# Patient Record
Sex: FEMALE | Race: BLACK OR AFRICAN AMERICAN | NOT HISPANIC OR LATINO | Employment: STUDENT | URBAN - METROPOLITAN AREA
[De-identification: names, ages, dates, MRNs, and addresses within clinical notes are randomized per-mention and may not be internally consistent; named-entity substitution may affect disease eponyms.]

---
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
## 2021-01-06 ENCOUNTER — OFFICE VISIT (OUTPATIENT)
Dept: OBGYN | Facility: CLINIC | Age: 21
End: 2021-01-06
Attending: PEDIATRICS
Payer: COMMERCIAL

## 2021-01-06 VITALS
OXYGEN SATURATION: 99 % | SYSTOLIC BLOOD PRESSURE: 114 MMHG | WEIGHT: 193 LBS | HEIGHT: 65 IN | BODY MASS INDEX: 32.15 KG/M2 | HEART RATE: 88 BPM | DIASTOLIC BLOOD PRESSURE: 76 MMHG

## 2021-01-06 DIAGNOSIS — N94.9 GENITAL LESION, FEMALE: Primary | ICD-10-CM

## 2021-01-06 DIAGNOSIS — N91.2 AMENORRHEA: ICD-10-CM

## 2021-01-06 PROCEDURE — 99203 OFFICE O/P NEW LOW 30 MIN: CPT | Performed by: ADVANCED PRACTICE MIDWIFE

## 2021-01-06 RX ORDER — TRIAMCINOLONE ACETONIDE 1 MG/G
OINTMENT TOPICAL 2 TIMES DAILY
Qty: 15 G | Refills: 0 | Status: SHIPPED | OUTPATIENT
Start: 2021-01-06 | End: 2021-07-21

## 2021-01-06 ASSESSMENT — PAIN SCALES - GENERAL: PAINLEVEL: NO PAIN (0)

## 2021-01-06 ASSESSMENT — MIFFLIN-ST. JEOR: SCORE: 1651.32

## 2021-01-06 NOTE — PROGRESS NOTES
Lauren Tian is a 20 year old who presents to the clinic for evaluation of amenorrhea and a genital lesion.   Menarche age 14 with a 3 day long 28 day cycle until she was 18 when she started depo for contraception.  Used on and off until last dose in April of 2020.  Had normal menses through LMP of 10/17 and none since.   Last sexual activity 10/27.  Recent UPT is negative.   Recent Gc/CT negative as well.   Admits that she has stress in her life but is not specific about what it entails other than COVID. No change in weight, skin hair etc.       Also has a lesion on her clitoris for about a year.  Flares about once a month and itches and then becomes sore and irritated.   Has been seen for this multiple times with no treatment or definitive diagnosis.  Not sure what makes it better or worse and has not applied anything to it.  Thinks that her clitoris always looks the same and that doesn't change with the symptoms      Histories reviewed and updated  Past Medical History:   Diagnosis Date     Seasonal allergic rhinitis      Past Surgical History:   Procedure Laterality Date     NO HISTORY OF SURGERY       Social History     Socioeconomic History     Marital status: Single     Spouse name: Not on file     Number of children: Not on file     Years of education: Not on file     Highest education level: Not on file   Occupational History     Not on file   Social Needs     Financial resource strain: Not on file     Food insecurity     Worry: Not on file     Inability: Not on file     Transportation needs     Medical: Not on file     Non-medical: Not on file   Tobacco Use     Smoking status: Never Smoker     Smokeless tobacco: Never Used   Substance and Sexual Activity     Alcohol use: No     Alcohol/week: 0.0 standard drinks     Drug use: No     Sexual activity: Not Currently     Partners: Male     Birth control/protection: Injection   Lifestyle     Physical activity     Days per week: Not on file     Minutes per  "session: Not on file     Stress: Not on file   Relationships     Social connections     Talks on phone: Not on file     Gets together: Not on file     Attends [employer] service: Not on file     Active member of club or organization: Not on file     Attends meetings of clubs or organizations: Not on file     Relationship status: Not on file     Intimate partner violence     Fear of current or ex partner: Not on file     Emotionally abused: Not on file     Physically abused: Not on file     Forced sexual activity: Not on file   Other Topics Concern     Not on file   Social History Narrative     Not on file     Family History   Problem Relation Age of Onset     No Known Problems Mother      Hypertension Maternal Grandmother      Hypertension Maternal Grandfather      Cerebrovascular Disease Father 43     No Known Problems Sister      Cancer No family hx of      Diabetes No family hx of      Thyroid Disease No family hx of      Glaucoma No family hx of      Macular Degeneration No family hx of           ROS: 10 point ROS neg other than the symptoms noted above in the HPI.    EXAM:  /76 (BP Location: Right arm, Patient Position: Sitting, Cuff Size: Adult Regular)   Pulse 88   Ht 1.651 m (5' 5\")   Wt 87.5 kg (193 lb)   LMP 10/22/2020 (Exact Date)   SpO2 99%   BMI 32.12 kg/m    GENERAL:  Pleasant female, no acute distress  EYES: sclera clear  RESP: breathing unlabored  CV: extremities without edema  M/S: no gross deformities  Neuro:  normal strength and sensation  : PELVIC EXAM:  Clitoris:  Appears slightly edematous.  There is basically a white line running down the center of the clitoris.  There are no ulcerations noted, no typical appearance of HPV or HSV.  Not typical of lichen planus or sclerosis either.     Vulva: No external lesions, normal hair distribution, no adenopathy, BUS WNL    Rectal exam: deferred  Psych:  alert, with normal speech and behavior        ASSESSMENT/PLAN:    (N94.9) Genital " lesion, female  (primary encounter diagnosis)  Comment:   Plan: triamcinolone (KENALOG) 0.1 % external ointment bid x 7 days  Recommend follow up with Dr Gunn if no relief.             (N91.2) Amenorrhea  Comment:   Plan:   Discussed amenorrhea, causes and concerns.   Would recommend follow up in 6 months from LMP in no menses in the mean time.          Visit length 30 minutes was spent face to face with the patient today discussing her history, diagnosis, and follow-up plan as noted above.  Over 50% of the visit was spent in counseling and coordination of care.    Time noted does not include time required to complete procedures.

## 2021-01-08 ENCOUNTER — OFFICE VISIT (OUTPATIENT)
Dept: OBGYN | Facility: CLINIC | Age: 21
End: 2021-01-08
Payer: COMMERCIAL

## 2021-01-08 VITALS
HEART RATE: 70 BPM | OXYGEN SATURATION: 100 % | WEIGHT: 192.1 LBS | DIASTOLIC BLOOD PRESSURE: 64 MMHG | BODY MASS INDEX: 31.97 KG/M2 | SYSTOLIC BLOOD PRESSURE: 115 MMHG

## 2021-01-08 DIAGNOSIS — N94.9 GENITAL LESION, FEMALE: ICD-10-CM

## 2021-01-08 DIAGNOSIS — Z30.011 ENCOUNTER FOR INITIAL PRESCRIPTION OF CONTRACEPTIVE PILLS: Primary | ICD-10-CM

## 2021-01-08 PROCEDURE — 99214 OFFICE O/P EST MOD 30 MIN: CPT | Performed by: OBSTETRICS & GYNECOLOGY

## 2021-01-08 RX ORDER — NORETHINDRONE ACETATE AND ETHINYL ESTRADIOL 1MG-20(21)
1 KIT ORAL DAILY
Qty: 84 TABLET | Refills: 3 | Status: SHIPPED | OUTPATIENT
Start: 2021-01-08

## 2021-01-08 NOTE — PROGRESS NOTES
OB/GYN      NAME:  Lauren Tian  PCP:  Travon Bernadine Radha  MRN:  [id number]    Impression / Plan     20 year old  with:      ICD-10-CM    1. Encounter for initial prescription of contraceptive pills  Z30.011 norethindrone-ethinyl estradiol (JUNEL FE 1/20) 1-20 MG-MCG tablet   2. Genital lesion, female  N94.9        Clitoral lesion concerning for lichen simplex chronicus.  Differential also includes lichen planus, HPV.  Plan triamcinolone ointment as prescribed by Leidy.  She will send us a 1bib message next Friday if her symptoms have not started to improve.      Amenorrhea - agree with Leidy - follow up in March if no menses, sooner as needed.     Patient would like to start birth control - discussed options and she would like to start MELANIE.  R/b/a discussed. She will be reasonably sure she is not pregnant before starting the pill.  Instructions and precautions given.  Menses may resume with the start of the MELANIE.     Chief Complaint     Chief Complaint   Patient presents with     Vaginal Problem       HPI     Lauren Tian is a  20 year old female who is seen for follow up.      Lesion on clitoris first noticed 6-7 months ago.  Itching. Comes and goes.  Pain only if she itches it too much.  Not sure if anything in particular makes it worse or better. Feels swollen.     Has not yet started the ointment that was prescribed by Leidy a couple days ago.     No abnormal discharge.     No exposure to STIs.     Not sexually active.    Was on Depo, last dose 3 months prior to July.  No period with the depo.  Normal cycles through October, nothing since.  Recent UPT negative.  Leidy recommended follow up in March if no menses.      At the end of the visit the patient asked about birth control.      Patient's last menstrual period was 10/26/2020.           Problem List     Patient Active Problem List    Diagnosis Date Noted     Amenorrhea 2021     Priority: Medium     Seasonal allergic  rhinitis 09/08/2014     Priority: Medium       Medications     Current Outpatient Medications   Medication     triamcinolone (KENALOG) 0.1 % external ointment     No current facility-administered medications for this visit.         Allergies     Allergies   Allergen Reactions     Pollen Extract        ROS     A  organ review of systems was asked and the pertinent positives and negatives are listed in the HPI.     Physical Exam   Vitals: /64 (BP Location: Right arm, Cuff Size: Adult Regular)   Pulse 70   Wt 87.1 kg (192 lb 1.6 oz)   LMP 10/26/2020   SpO2 100%   Breastfeeding No   BMI 31.97 kg/m      General: Comfortable, no obvious distress  :  Clitoral lesion at 6 oclock, less than 0.5 cm, hypopigmented. Clitoral area and labia are thickened bilaterally. No abnormal discharge.  No other lesions.           36 minutes spent on the date of the encounter doing chart review, patient visit and documentation     Joie Gunn MD

## 2022-02-19 ENCOUNTER — OFFICE VISIT (OUTPATIENT)
Dept: URGENT CARE | Facility: URGENT CARE | Age: 22
End: 2022-02-19
Payer: COMMERCIAL

## 2022-02-19 VITALS
TEMPERATURE: 97.3 F | OXYGEN SATURATION: 97 % | SYSTOLIC BLOOD PRESSURE: 114 MMHG | BODY MASS INDEX: 33.45 KG/M2 | WEIGHT: 201 LBS | HEART RATE: 76 BPM | DIASTOLIC BLOOD PRESSURE: 75 MMHG | RESPIRATION RATE: 22 BRPM

## 2022-02-19 DIAGNOSIS — N76.0 VAGINITIS AND VULVOVAGINITIS: ICD-10-CM

## 2022-02-19 DIAGNOSIS — R30.0 DYSURIA: ICD-10-CM

## 2022-02-19 DIAGNOSIS — B96.89 BV (BACTERIAL VAGINOSIS): Primary | ICD-10-CM

## 2022-02-19 DIAGNOSIS — N76.0 BV (BACTERIAL VAGINOSIS): Primary | ICD-10-CM

## 2022-02-19 LAB
ALBUMIN UR-MCNC: NEGATIVE MG/DL
APPEARANCE UR: CLEAR
BILIRUB UR QL STRIP: NEGATIVE
CLUE CELLS: PRESENT
COLOR UR AUTO: YELLOW
GLUCOSE UR STRIP-MCNC: NEGATIVE MG/DL
HGB UR QL STRIP: NEGATIVE
KETONES UR STRIP-MCNC: NEGATIVE MG/DL
LEUKOCYTE ESTERASE UR QL STRIP: NEGATIVE
NITRATE UR QL: NEGATIVE
PH UR STRIP: 6 [PH] (ref 5–7)
SP GR UR STRIP: >=1.03 (ref 1–1.03)
TRICHOMONAS, WET PREP: ABNORMAL
UROBILINOGEN UR STRIP-ACNC: 1 E.U./DL
WBC'S/HIGH POWER FIELD, WET PREP: ABNORMAL
YEAST, WET PREP: ABNORMAL

## 2022-02-19 PROCEDURE — 87210 SMEAR WET MOUNT SALINE/INK: CPT | Performed by: PHYSICIAN ASSISTANT

## 2022-02-19 PROCEDURE — 99214 OFFICE O/P EST MOD 30 MIN: CPT | Performed by: PHYSICIAN ASSISTANT

## 2022-02-19 PROCEDURE — 81003 URINALYSIS AUTO W/O SCOPE: CPT | Performed by: PHYSICIAN ASSISTANT

## 2022-02-19 RX ORDER — FLUCONAZOLE 150 MG/1
150 TABLET ORAL ONCE
Qty: 2 TABLET | Refills: 0 | Status: SHIPPED | OUTPATIENT
Start: 2022-02-19 | End: 2022-02-19

## 2022-02-19 RX ORDER — METRONIDAZOLE 500 MG/1
500 TABLET ORAL 2 TIMES DAILY
Qty: 14 TABLET | Refills: 0 | Status: SHIPPED | OUTPATIENT
Start: 2022-02-19 | End: 2022-02-26

## 2022-02-19 RX ORDER — CETIRIZINE HYDROCHLORIDE 10 MG/1
1 TABLET ORAL DAILY
COMMUNITY
Start: 2021-04-05

## 2022-02-19 ASSESSMENT — ENCOUNTER SYMPTOMS
CHILLS: 0
CARDIOVASCULAR NEGATIVE: 1
VOMITING: 0
DYSURIA: 1
FATIGUE: 0
NAUSEA: 0
WHEEZING: 0
CONSTIPATION: 0
HEARTBURN: 0
FLANK PAIN: 0
FEVER: 0
SHORTNESS OF BREATH: 0
RESPIRATORY NEGATIVE: 1
HEMATURIA: 0
ABDOMINAL PAIN: 0
HEMATOCHEZIA: 0
CHEST TIGHTNESS: 0
GASTROINTESTINAL NEGATIVE: 1
PALPITATIONS: 0
COUGH: 0
DIARRHEA: 0
FREQUENCY: 1

## 2022-02-19 NOTE — PROGRESS NOTES
Subjective   Lauren is a 21 year old who presents for the following health issues   HPI   Vaginal Symptoms  Onset/Duration: 2months, on and off  Description:  Vaginal Discharge: white   Itching (Pruritis): YES  Burning sensation:  YES  Odor: no  Accompanying Signs & Symptoms:  Urinary symptoms: YES- dysuria and frequency but no blood present  Abdominal pain: no  Fever: no  History:   Sexually active: no  New Partner: no  Possibility of Pregnancy:  no  Recent antibiotic use: no  Previous vaginitis issues: no  Precipitating or alleviating factors: None  Therapies tried and outcome: OTC yeast infection cream with minimal relief      Patient Active Problem List   Diagnosis     Seasonal allergic rhinitis     Amenorrhea         Allergies   Allergen Reactions     Pollen Extract        Review of Systems   Constitutional: Negative for chills, fatigue and fever.   Respiratory: Negative.  Negative for cough, chest tightness, shortness of breath and wheezing.    Cardiovascular: Negative.  Negative for chest pain, palpitations and peripheral edema.   Gastrointestinal: Negative.  Negative for abdominal pain, constipation, diarrhea, heartburn, hematochezia, nausea and vomiting.   Genitourinary: Positive for dysuria, frequency and vaginal discharge. Negative for flank pain, hematuria, pelvic pain, urgency and vaginal bleeding.   All other systems reviewed and are negative.           Objective    /75 (BP Location: Left arm, Patient Position: Sitting, Cuff Size: Adult Large)   Pulse 76   Temp 97.3  F (36.3  C) (Tympanic)   Resp 22   Wt 91.2 kg (201 lb)   SpO2 97%   BMI 33.45 kg/m    Body mass index is 33.45 kg/m .  Physical Exam  Vitals and nursing note reviewed.   Constitutional:       General: She is not in acute distress.     Appearance: Normal appearance. She is well-developed and normal weight. She is not ill-appearing.   Cardiovascular:      Rate and Rhythm: Normal rate and regular rhythm.      Pulses: Normal  pulses.      Heart sounds: Normal heart sounds, S1 normal and S2 normal. No murmur heard.    No friction rub. No gallop.   Pulmonary:      Effort: Pulmonary effort is normal. No accessory muscle usage or respiratory distress.      Breath sounds: Normal breath sounds and air entry. No decreased breath sounds, wheezing, rhonchi or rales.   Abdominal:      General: Abdomen is flat. Bowel sounds are normal.      Palpations: Abdomen is soft. There is no hepatomegaly, splenomegaly or mass.      Tenderness: There is no abdominal tenderness. There is no right CVA tenderness, left CVA tenderness, guarding or rebound. Negative signs include Norwood's sign, Rovsing's sign, McBurney's sign, psoas sign and obturator sign.      Hernia: No hernia is present.   Genitourinary:     General: Normal vulva.      Exam position: Lithotomy position.      Pubic Area: No rash or pubic lice.       Labia:         Right: No rash, tenderness or lesion.         Left: No rash, tenderness or lesion.       Urethra: No prolapse or urethral pain.      Vagina: No signs of injury and foreign body. Vaginal discharge present. No erythema, tenderness, bleeding, lesions or prolapsed vaginal walls.      Cervix: No cervical motion tenderness, discharge, friability, lesion, erythema, cervical bleeding or eversion.      Uterus: Normal.       Adnexa: Right adnexa normal and left adnexa normal.        Right: No mass or tenderness.          Left: No mass or tenderness.     Skin:     General: Skin is warm and dry.   Neurological:      Mental Status: She is alert and oriented to person, place, and time.   Psychiatric:         Mood and Affect: Mood normal.         Behavior: Behavior normal.         Thought Content: Thought content normal.         Judgment: Judgment normal.       Results for orders placed or performed in visit on 02/19/22 (from the past 24 hour(s))   Wet prep - lab collect    Specimen: Vagina; Swab   Result Value Ref Range    Trichomonas Absent Absent     Yeast Absent Absent    Clue Cells Present (A) Absent    WBCs/high power field 1+ (A) None   UA Macro with Reflex to Micro and Culture - lab collect    Specimen: Urine, Midstream   Result Value Ref Range    Color Urine Yellow Colorless, Straw, Light Yellow, Yellow    Appearance Urine Clear Clear    Glucose Urine Negative Negative mg/dL    Bilirubin Urine Negative Negative    Ketones Urine Negative Negative mg/dL    Specific Gravity Urine >=1.030 1.003 - 1.035    Blood Urine Negative Negative    pH Urine 6.0 5.0 - 7.0    Protein Albumin Urine Negative Negative mg/dL    Urobilinogen Urine 1.0 0.2, 1.0 E.U./dL    Nitrite Urine Negative Negative    Leukocyte Esterase Urine Negative Negative    Narrative    Microscopic not indicated          Assessment/Plan:  BV (bacterial vaginosis):  Wet prep showed clue cells present.  She declined STD testing. Will treat with metronidazole X1week.  No ETOH while on medications due to disulfuram reaction.  Avoid foreign body insertion, scented personal care products and frequent baths.  Recheck in clinic if symptoms worsen or if symptoms do not improve.  Will send to GYN if no improvement.  -     Wet prep - lab collect  -     metroNIDAZOLE (FLAGYL) 500 MG tablet; Take 1 tablet (500 mg) by mouth 2 times daily for 7 days    Vaginitis and vulvovaginitis:  Will also give diflucan for possible yeast as well.  -     fluconazole (DIFLUCAN) 150 MG tablet; Take 1 tablet (150 mg) by mouth once for 1 dose May repeat after 3days  -     Ob/Gyn Referral    Dysuria:  UA was normal.  Most likely vaginitis related.  -     Wet prep - lab collect; Future  -     UA Macro with Reflex to Micro and Culture - lab collect; Future  -     UA Macro with Reflex to Micro and Culture - lab collect        Libia Zheng PA-C

## 2022-07-03 ENCOUNTER — HEALTH MAINTENANCE LETTER (OUTPATIENT)
Age: 22
End: 2022-07-03

## 2023-01-08 ENCOUNTER — HEALTH MAINTENANCE LETTER (OUTPATIENT)
Age: 23
End: 2023-01-08

## 2023-07-16 ENCOUNTER — HEALTH MAINTENANCE LETTER (OUTPATIENT)
Age: 23
End: 2023-07-16

## 2024-07-01 LAB — CHLAMYDIA - HIM PATIENT REPORTED: NORMAL

## 2024-08-01 LAB — RETINOPATHY: NORMAL

## 2024-09-08 ENCOUNTER — HEALTH MAINTENANCE LETTER (OUTPATIENT)
Age: 24
End: 2024-09-08

## 2024-10-01 ENCOUNTER — TRANSFERRED RECORDS (OUTPATIENT)
Dept: MULTI SPECIALTY CLINIC | Facility: CLINIC | Age: 24
End: 2024-10-01

## 2024-10-01 LAB — PAP SMEAR - HIM PATIENT REPORTED: NORMAL

## 2025-02-24 ENCOUNTER — OFFICE VISIT (OUTPATIENT)
Dept: FAMILY MEDICINE | Facility: CLINIC | Age: 25
End: 2025-02-24
Payer: COMMERCIAL

## 2025-02-24 VITALS
TEMPERATURE: 97.8 F | DIASTOLIC BLOOD PRESSURE: 78 MMHG | RESPIRATION RATE: 20 BRPM | HEIGHT: 67 IN | HEART RATE: 80 BPM | WEIGHT: 232 LBS | OXYGEN SATURATION: 97 % | BODY MASS INDEX: 36.41 KG/M2 | SYSTOLIC BLOOD PRESSURE: 120 MMHG

## 2025-02-24 DIAGNOSIS — J06.9 VIRAL URI WITH COUGH: Primary | ICD-10-CM

## 2025-02-24 PROCEDURE — 99203 OFFICE O/P NEW LOW 30 MIN: CPT | Performed by: PHYSICIAN ASSISTANT

## 2025-02-24 RX ORDER — BENZONATATE 200 MG/1
200 CAPSULE ORAL 3 TIMES DAILY PRN
Qty: 40 CAPSULE | Refills: 0 | Status: SHIPPED | OUTPATIENT
Start: 2025-02-24

## 2025-02-24 NOTE — PROGRESS NOTES
"  Assessment & Plan     Viral URI with cough  With current high incidence of Influenza A likely influenza. Offered testing, patient declined at will not change treatment plan. Did discuss could also be COVID, declines testing. Discussed symptom care and will try tessalon as needed.   Follow up if fever or worsening shortness of breath or symptoms.   - benzonatate (TESSALON) 200 MG capsule; Take 1 capsule (200 mg) by mouth 3 times daily as needed for cough.          BMI  Estimated body mass index is 36.34 kg/m  as calculated from the following:    Height as of this encounter: 1.702 m (5' 7\").    Weight as of this encounter: 105.2 kg (232 lb).             Subjective   Lauren is a 24 year old, presenting for the following health issues:  Cough and Pharyngitis        2/24/2025     9:32 AM   Additional Questions   Roomed by Isidra VERMA       Via the [employer], the patient has reported the following services have been completed -Chlamydia: n/a 2024-07-01-Eye Exam: [facility] 2024-08-01-Cervical Cancer Screening: n/a 2024-10-01, this information has been sent to the abstraction team.  History of Present Illness       Reason for visit:  Cold  Symptom onset:  3-7 days ago  Symptoms include:  Coughing, sore throat, stuffy nose (sinus)  Symptom intensity:  Severe  Symptom progression:  Worsening  Had these symptoms before:  Yes  Has tried/received treatment for these symptoms:  Yes  Previous treatment was successful:  No  What makes it worse:  No  What makes it better:  Drinking warm water   She is taking medications regularly.     Started Thursday  Lives in [place] and She is in town until Sunday.   Sore throat and voice left.   Coughing a lot.   Can't stop coughing.   Dayquil and theraflu not improving.     No fever. No known exposure to flu or COVID.                     Objective    /78 (BP Location: Left arm, Patient Position: Sitting, Cuff Size: Adult Large)   Pulse 80   Temp 97.8  F (36.6 " " C) (Temporal)   Resp 20   Ht 1.702 m (5' 7\")   Wt 105.2 kg (232 lb)   LMP 02/22/2025 (Approximate)   SpO2 97%   BMI 36.34 kg/m    Body mass index is 36.34 kg/m .  Physical Exam   GENERAL: alert and no distress  HENT: ear canals and TM's normal, nose with mild nasal congestion and mouth without ulcers or lesions  NECK: no adenopathy, no asymmetry, masses, or scars  RESP: lungs clear to auscultation - no rales, rhonchi or wheezes  CV: regular rate and rhythm, normal S1 S2, no S3 or S4, no murmur,   PSYCH: mentation appears normal, affect normal/bright            Signed Electronically by: Denia Johnson PA-C    "

## 2025-05-19 ENCOUNTER — PATIENT OUTREACH (OUTPATIENT)
Dept: CARE COORDINATION | Facility: CLINIC | Age: 25
End: 2025-05-19
Payer: COMMERCIAL

## 2025-08-03 ENCOUNTER — HEALTH MAINTENANCE LETTER (OUTPATIENT)
Age: 25
End: 2025-08-03